# Patient Record
Sex: MALE | Race: WHITE
[De-identification: names, ages, dates, MRNs, and addresses within clinical notes are randomized per-mention and may not be internally consistent; named-entity substitution may affect disease eponyms.]

---

## 2017-03-08 ENCOUNTER — HOSPITAL ENCOUNTER (OUTPATIENT)
Dept: HOSPITAL 80 - FCATH | Age: 67
Setting detail: OBSERVATION
LOS: 1 days | Discharge: HOME | End: 2017-03-09
Admitting: INTERNAL MEDICINE
Payer: COMMERCIAL

## 2017-03-08 DIAGNOSIS — I25.10: ICD-10-CM

## 2017-03-08 DIAGNOSIS — E55.9: ICD-10-CM

## 2017-03-08 DIAGNOSIS — D51.0: ICD-10-CM

## 2017-03-08 DIAGNOSIS — R55: Primary | ICD-10-CM

## 2017-03-08 LAB
% IMMATURE GRANULYOCYTES: 0.2 % (ref 0–1.1)
ABSOLUTE IMMATURE GRANULOCYTES: 0.01 10^3/UL (ref 0–0.1)
ABSOLUTE NRBC COUNT: 0 10^3/UL (ref 0–0.01)
ADD DIFF?: NO
ADD MORPH?: NO
ADD SCAN?: NO
ANION GAP SERPL CALC-SCNC: 10 MEQ/L (ref 8–16)
APTT BLD: 32.3 SEC (ref 23–38)
ATYPICAL LYMPHOCYTE FLAG: 0 (ref 0–99)
CALCIUM SERPL-MCNC: 9.5 MG/DL (ref 8.5–10.4)
CHLORIDE SERPL-SCNC: 107 MEQ/L (ref 97–110)
CO2 SERPL-SCNC: 25 MEQ/L (ref 22–31)
CREAT SERPL-MCNC: 0.9 MG/DL (ref 0.7–1.3)
ERYTHROCYTE [DISTWIDTH] IN BLOOD BY AUTOMATED COUNT: 13.2 % (ref 11.5–15.2)
FRAGMENT RBC FLAG: 0 (ref 0–99)
GFR SERPL CREATININE-BSD FRML MDRD: > 60 ML/MIN/{1.73_M2}
GLUCOSE SERPL-MCNC: 109 MG/DL (ref 70–100)
HCT VFR BLD CALC: 47.8 % (ref 40–51)
HGB BLD-MCNC: 16.4 G/DL (ref 13.7–17.5)
INR PPP: 1.04 (ref 0.83–1.16)
LEFT SHIFT FLG: 0 (ref 0–99)
LIPEMIA HEMOLYSIS FLAG: 90 (ref 0–99)
MAGNESIUM SERPL-MCNC: 2.2 MG/DL (ref 1.6–2.3)
MCH RBC BLDCO QN: 30.3 PG (ref 27.9–34.1)
MCHC RBC AUTO-ENTMCNC: 34.3 G/DL (ref 32.4–36.7)
MCV RBC AUTO: 88.2 FL (ref 81.5–99.8)
NRBC-AUTO%: 0 % (ref 0–0.2)
PLATELET # BLD: 342 10^3/UL (ref 150–400)
PLATELET CLUMPS FLAG: 0 (ref 0–99)
PMV BLD AUTO: 9.3 FL (ref 8.7–11.7)
POTASSIUM SERPL-SCNC: 4.4 MEQ/L (ref 3.5–5.2)
PROTHROMBIN TIME: 13.5 SEC (ref 12–15)
RBC # BLD AUTO: 5.42 10^6/UL (ref 4.4–6.38)
SODIUM SERPL-SCNC: 142 MEQ/L (ref 134–144)

## 2017-03-08 PROCEDURE — C1887 CATHETER, GUIDING: HCPCS

## 2017-03-08 PROCEDURE — 93005 ELECTROCARDIOGRAM TRACING: CPT

## 2017-03-08 PROCEDURE — 027034Z DILATION OF CORONARY ARTERY, ONE ARTERY WITH DRUG-ELUTING INTRALUMINAL DEVICE, PERCUTANEOUS APPROACH: ICD-10-PCS | Performed by: INTERNAL MEDICINE

## 2017-03-08 PROCEDURE — C1769 GUIDE WIRE: HCPCS

## 2017-03-08 PROCEDURE — 93454 CORONARY ARTERY ANGIO S&I: CPT

## 2017-03-08 PROCEDURE — C1725 CATH, TRANSLUMIN NON-LASER: HCPCS

## 2017-03-08 PROCEDURE — B2151ZZ FLUOROSCOPY OF LEFT HEART USING LOW OSMOLAR CONTRAST: ICD-10-PCS

## 2017-03-08 PROCEDURE — C1874 STENT, COATED/COV W/DEL SYS: HCPCS

## 2017-03-08 PROCEDURE — C1764 EVENT RECORDER, CARDIAC: HCPCS

## 2017-03-08 PROCEDURE — B2111ZZ FLUOROSCOPY OF MULTIPLE CORONARY ARTERIES USING LOW OSMOLAR CONTRAST: ICD-10-PCS

## 2017-03-08 PROCEDURE — C1760 CLOSURE DEV, VASC: HCPCS

## 2017-03-08 PROCEDURE — C9600 PERC DRUG-EL COR STENT SING: HCPCS

## 2017-03-08 PROCEDURE — 33282: CPT

## 2017-03-08 PROCEDURE — 4A023N7 MEASUREMENT OF CARDIAC SAMPLING AND PRESSURE, LEFT HEART, PERCUTANEOUS APPROACH: ICD-10-PCS | Performed by: INTERNAL MEDICINE

## 2017-03-08 NOTE — CPIP
DATE OF PROCEDURE:  03/08/2017



INDICATION FOR PROCEDURE:  Non-prodromal syncope.



PROCEDURE PERFORMED:  Left heart catheterization.



PATIENT SUMMARY:  The patient is a pleasant 67-year-old gentleman, who has been 
followed closely by Dr. Otto at [facility], who was 
in his usual state of health until last fall when he had an episode of non-
prodromal syncope while riding his bike.  He had no memory of the details 
surrounding the event.  He did undergo extensive workup including unremarkable 
complete 2D echocardiogram, normal exercise nuclear stress test, normal event 
monitoring.  Cardiac MRI was also unremarkable.  Did have a CT of the chest, 
which did demonstrate isolated right coronary artery plaque. 



In the setting of non-prodromal syncope coupled with evidence of PVCs, plan was 
for electrophysiology study with Dr. Quinones.  However, in the setting of PVCs and 
evidence of calcified plaque on CT of the chest, diagnostic left heart 
catheterization was performed.



PROCEDURE:  After an informed consent was obtained, the patient was brought to 
the cardiac catheterization lab, where he was prepped and draped in a sterile 
fashion.  Using 1% lidocaine, the right groin was anesthetized.  Using modified 
Seldinger technique, a 6-British sheath was placed into the right common femoral 
artery without complication.  Side port was flushed without complication. 



A JL4 catheter was used to take images of the left coronary anatomy in multiple 
projections.  The JL4 catheter was exchanged over a guidewire for a JR4 
catheter.  JR4 catheter was used to take images of the right coronary anatomy 
in multiple projections.  Right JR4 catheter was removed over a wire without 
complications.



FINDINGS:  

1.  Left main normal size and caliber and bifurcates into the left anterior 
descending and left circumflex coronary artery.  Left main was free of coronary 
artery disease.  Left main provided 1st and 2nd diagonal branch and multiple 
septal perforators.  There was no evidence of coronary disease throughout the 
left anterior descending artery.

2.  Left circumflex artery was a codominant vessel.  There was no evidence of 
coronary disease within the circumflex vessels as well as a large 1st obtuse 
marginal branch.  Right coronary artery was a large-caliber codominant vessel.  
There was an 80% tubular proximal stenosis in the right coronary artery.



CONCLUSION:  

1.  Severe single-vessel coronary artery disease.

2.  Normal left coronary anatomy.



I have reviewed these images with my interventional colleague, Dr. Ricardo 
Eleuterio.  The patient was scheduled for immediate post-diagnostic left heart 
catheterization percutaneous coronary intervention to the right coronary 
artery.  Please see his dictation for details.





Job #:  [id number]

MTDD

## 2017-03-08 NOTE — GHP
DATE OF ADMISSION:  03/08/2017



INDICATION FOR ADMISSION:  Post PCI.



HISTORY OF PRESENT ILLNESS:  The patient is a pleasant 67-year-old gentleman with a past medical his
tory of hyperlipidemia, left ventricular hypertrophy, history of pernicious anemia, and recent histo
ry of syncope without prodrome while riding his bike.  He had undergone extensive workup to date, in
cluding complete 2D echocardiogram, normal exercise nuclear stress test, unremarkable Holter monitor
, as well as cardiac MRI.  He was scheduled for outpatient elective admission today for diagnostic l
eft heart catheterization, followed by EP study and implantable loop recorder. 



Diagnostic left heart catheterization demonstrated a significant focal tubular stenosis of the proxi
mal codominant right coronary artery.  He underwent successful percutaneous coronary intervention to
 the proximal right coronary artery without complication.  In the setting of finding significant sin
gle-vessel coronary artery disease, EP study was canceled.  Plan for implantable loop recorder to be
 placed tomorrow prior to discharge. 



He tolerated the procedure well.



PAST MEDICAL HISTORY:  

1.  Pernicious anemia.  

2.  Vitamin D deficiency.  

3.  Hyperlipidemia.

4.  History of left ventricular hypertrophy.  

5.  History of non-prodromal syncope.



OUTPATIENT MEDICATIONS:  Include:

1.  Aspirin 81 mg daily.  

2.  Atorvastatin 20 mg daily.  

3.  CoQ10 200 mg once daily.

4.  Fish oil daily.

5.  Multivitamin once daily.

6.  Vitamin B12 injections 1000 mcg/mL, 1500 mcg injection IM q.1 month.

7.  Vitamin D3 400 units daily.

8.  Cyclobenzaprine 10 mg, take 1/2 tab p.o. q.h.s. p.r.n. muscle pain.

9.  Cialis 10 mg p.o. p.r.n.



ALLERGIES:  No known drug allergies.



SOCIAL HISTORY:  He is .  He lives with his wife.  He is an avid cyclist.  He has a son who i
s an ER physician.  He is a nonsmoker.



FAMILY HISTORY:  No family history of premature coronary artery disease.



EXAMINATION:  GENERAL:  Prior to left heart catheterization, patient was awake, alert, oriented, alison
ropriate, no apparent distress.  The patient was hemodynamically stable.



IMPRESSION:  

1.  Severe single-vessel coronary artery disease.  

2.  Successful PCI to the proximal codominant right coronary artery.  

3.  Preserved left ventricular function.  

4.  History of syncope.



PLAN:  

1.  Admit to [facility] overnight.

2.  Initiate aspirin 325 mg once daily.

3.  Prasugrel 60 mg loading today.  Will start 10 mg p.o. daily tomorrow.

4.  Continue atorvastatin 20 mg daily.  

5.  The patient will remain n.p.o. after midnight tonight in anticipation of implantable loop record
er tomorrow morning, to be done prior to discharge.





Job #:  [id number]

## 2017-03-08 NOTE — CPEKG
Heart Rate: 75

RR Interval: 800

P-R Interval: 180

QRSD Interval: 98

QT Interval: 384

QTC Interval: 429

P Axis: -1

QRS Axis: -32

T Wave Axis: 36

EKG Severity - OTHERWISE NORMAL ECG -

EKG Impression: SINUS RHYTHM

EKG Impression: LEFT AXIS DEVIATION

Electronically Signed By: Cecilia Altamirano 08-Mar-2017 09:17:25

## 2017-03-08 NOTE — PDDXCAT
Diagnostic Cath Note





- .


Date: 03/08/17


Intervention: 


See Below





*Procedure


 


1. Selective coronary angiography


2. Drug eluting stent implantation in the mid right coronary artery





Indication: I was called for an intraoperative consultation after the 

identification of an 80% mid right coronary artery lesion during diagnostic 

cardiac catheterization with Dr. Segura.





Access: right femoral artery





*Materials





Coronary Angiography Size: 6F





Coronary Angiography Materials: JR4.0, 0.014" Intuition Guide Wire, 3.0 x 32 mm 

Synergy drug eluting stent, 3.5 x 15 NC Emerge balloon, 4.5 x 8 mm compliant 

[place] Emerge balloon.





*Findings-Selective coronary angiography





RCA: The right coronary artery is dominant and ~3.5 mm in size. There is a mid 

RCA lesion estimated to be 80% in plaque area stenosis with LYNN III flow and a 

prestenotic dilation of the artery estimated to be 4.5 mm in size. This lesion 

was revascularized with a 3.0 x 32 mm Synergy drug eluting stent as outlined 

below.





*Intervention





Intervention: 


A 6 Upper sorbian JR4.0 guiding catheter was used for guide catheter support. A 0.014" 

Intuition Guide Wire was advanced to the distal right coronary artery under 

direct fluoroscopic and angiographic guidance. Angiomax bolus and continuous 

infusion were given continuously with a documented ACT over 300 seconds. 





A 3.0 x 32 mm Synergy drug eluting stent was placed in the RCA lesion (80% with 

LYNN III flow) and inflated to a maximum of 18 yoshi of pressure. S/p stent 

implantation, a 3.5 x 15 mm NC Emerge balloon was used to post-dilate the 

lesion under 16 yoshi of pressure. S/p post-dilation, the stent was still not 

opposed to the aneurysmal segment of the vessel wall proximal to the original 

lesion. A 4.5 x 8 mm complaint [place] Emerge balloon was placed in this segment 

and inflated to 16 yoshi of pressure to solve that issue.  





The stent appeared well-opposed with 0% residual stenosis and LYNN III flow s/p 

stent implantation and post dilation with both NC and compliant balloons.





*Summary


Complications: None


Estimated blood loss: <50ml


Closure method: Angioseal





Assessment: 





1. Successful drug eluting stent implantation in the mid right coronary artery 

with excellent angiographic results (80% plaque area stenosis pre stent 

implantation with LYNN III flow | 0% plaque area stenosis post stent 

implantation with LYNN III flow).


Patient Problems: 


 Problems











Problem Status Onset


 


MSSA (methicillin susceptible Staphylococcus aureus) Acute  


 


Osteomyelitis of right elbow Acute  


 


Septic bursitis of elbow Acute

## 2017-03-08 NOTE — CPEKG
Heart Rate: 72

RR Interval: 833

P-R Interval: 192

QRSD Interval: 96

QT Interval: 396

QTC Interval: 434

P Axis: 27

QRS Axis: -15

T Wave Axis: 33

EKG Severity - OTHERWISE NORMAL ECG -

EKG Impression: SINUS RHYTHM

EKG Impression: BORDERLINE LEFT AXIS DEVIATION

Electronically Signed By: Cecilia Altamirano 08-Mar-2017 15:22:12

## 2017-03-09 VITALS
DIASTOLIC BLOOD PRESSURE: 76 MMHG | HEART RATE: 75 BPM | OXYGEN SATURATION: 92 % | SYSTOLIC BLOOD PRESSURE: 116 MMHG | TEMPERATURE: 97.9 F | RESPIRATION RATE: 16 BRPM

## 2017-03-09 LAB
% IMMATURE GRANULYOCYTES: 0.1 % (ref 0–1.1)
ABSOLUTE IMMATURE GRANULOCYTES: 0.01 10^3/UL (ref 0–0.1)
ABSOLUTE NRBC COUNT: 0 10^3/UL (ref 0–0.01)
ADD DIFF?: NO
ADD MORPH?: NO
ADD SCAN?: NO
ALBUMIN SERPL-MCNC: 3.5 G/DL (ref 3.5–5)
ANION GAP SERPL CALC-SCNC: 7 MEQ/L (ref 8–16)
AST SERPL-CCNC: 22 IU/L (ref 17–59)
ATYPICAL LYMPHOCYTE FLAG: 0 (ref 0–99)
BILIRUB SERPL-MCNC: 0.8 MG/DL (ref 0.1–1.4)
CALCIUM SERPL-MCNC: 9.3 MG/DL (ref 8.5–10.4)
CHLORIDE SERPL-SCNC: 109 MEQ/L (ref 97–110)
CO2 SERPL-SCNC: 25 MEQ/L (ref 22–31)
CREAT SERPL-MCNC: 0.8 MG/DL (ref 0.7–1.3)
ERYTHROCYTE [DISTWIDTH] IN BLOOD BY AUTOMATED COUNT: 13.4 % (ref 11.5–15.2)
FRAGMENT RBC FLAG: 0 (ref 0–99)
GFR SERPL CREATININE-BSD FRML MDRD: > 60 ML/MIN/{1.73_M2}
GLUCOSE SERPL-MCNC: 89 MG/DL (ref 70–100)
HCT VFR BLD CALC: 45.5 % (ref 40–51)
HGB BLD-MCNC: 15.7 G/DL (ref 13.7–17.5)
LDH SERPL-CCNC: 349 IU/L (ref 313–618)
LEFT SHIFT FLG: 0 (ref 0–99)
LIPEMIA HEMOLYSIS FLAG: 90 (ref 0–99)
MAGNESIUM SERPL-MCNC: 2.1 MG/DL (ref 1.6–2.3)
MCH RBC BLDCO QN: 30.7 PG (ref 27.9–34.1)
MCHC RBC AUTO-ENTMCNC: 34.5 G/DL (ref 32.4–36.7)
MCV RBC AUTO: 88.9 FL (ref 81.5–99.8)
NRBC-AUTO%: 0 % (ref 0–0.2)
PLATELET # BLD: 304 10^3/UL (ref 150–400)
PLATELET CLUMPS FLAG: 0 (ref 0–99)
PMV BLD AUTO: 9.7 FL (ref 8.7–11.7)
POTASSIUM SERPL-SCNC: 4.3 MEQ/L (ref 3.5–5.2)
RBC # BLD AUTO: 5.12 10^6/UL (ref 4.4–6.38)
SODIUM SERPL-SCNC: 141 MEQ/L (ref 134–144)

## 2017-03-09 PROCEDURE — 0JH63PZ INSERTION OF CARDIAC RHYTHM RELATED DEVICE INTO CHEST SUBCUTANEOUS TISSUE AND FASCIA, PERCUTANEOUS APPROACH: ICD-10-PCS | Performed by: INTERNAL MEDICINE

## 2017-03-09 NOTE — CPEKG
Heart Rate: 75

RR Interval: 800

P-R Interval: 180

QRSD Interval: 100

QT Interval: 380

QTC Interval: 425

P Axis: -12

QRS Axis: -36

T Wave Axis: 48

EKG Severity - OTHERWISE NORMAL ECG -

EKG Impression: SINUS RHYTHM

EKG Impression: LEFT AXIS DEVIATION

Electronically Signed By: Cecilia Altamirano 09-Mar-2017 15:09:41

## 2017-03-09 NOTE — GPN
[f rep st]



                                                                 PROCEDURE NOTE





PROCEDURE PERFORMED:  Implantable loop recorder, Medtronic.



INDICATION FOR PROCEDURE:  Non prodromal syncope.



PROCEDURE:  After informed consent was obtained, the patient was brought to the cardiac procedure ro
om where he was prepped and draped in a sterile fashion.  Using 1% lidocaine, the left fourth interc
ostal space was 2 cm from midline was anesthetized.  Once appropriate level of anesthesia was obtain
ed locally, incision was made with the scalpel blade provided in the Medtronic kit with modification
 of this incision with scalpel blade.  Appropriate tract with the syringe was used.  The device was 
implanted at a 45 degree angle without difficulty.  This was at least 1 cm below the incision.  Michelle
ent tolerated the procedure well.  Hemostasis was achieved.  Sterile dressing and Steri-Strips were 
applied.  There were no postoperative complications. 



Device was checked demonstrating normal device function.



PLAN:  

1.  Patient has been given postoperative instructions. 

2.  Patient will follow up in our office next Friday for device check and wound check.





Job #:  [id number]

## 2017-03-09 NOTE — GDS
[f rep st]



                                                             DISCHARGE SUMMARY





INDICATION:  Elective admission for diagnostic left heart catheterization, followed by possible AJIT morales in the setting of nonprodromal syncope and evidence of calcification in the right coronary negrita
ry and ventricular ectopy noted during treadmill stress test.



HOSPITAL COURSE:  Mr. Miranda underwent diagnostic left heart catheterization prior to the to EP stud
y.  Left heart catheterization demonstrated significant single-vessel coronary artery disease with a
 focal proximal tubular stenosis of approximately 80% in a codominant right coronary artery.  He und
erwent successful deployment of a single 3.0 x 32 mm Synergy drug-eluting stent to the proximal righ
t coronary artery disease without complications.  He tolerated the procedure well. 



He remained hemodynamically stable overnight without complications.  On the morning of March 9, 2017
, he underwent implantable loop recorder deployment without complication.  He tolerated the procedur
e well.  Hemostasis was achieved.  Sterile dressing was applied.  Device check demonstrated normal d
evice function. 



He remained stable for discharge after implantable loop recorder deployment.



MEDICATIONS AT TIME OF DISCHARGE:  

1.  Aspirin 325 mg once daily.  

2.  Prasugrel/Effient 10 mg daily.

3.  Atorvastatin 20 mg daily.

4.  Nitroglycerin 0.4 mg sublingual p.r.n. chest pain x3 q.5 minutes.

5.  Temazepam 50 mg p.o. h.s. p.r.n.

6.  Vitamin D3 1000 units daily.

7.  B12 1000 mcg injection of 1500 mcg IM q.1 month.

8.  Fish oil 1000 mg capsule once daily.

9.  Multivitamin once daily.



ALLERGIES:  To medications, none.



EXAM:  At time of discharge, patient is awake, alert, oriented and appropriate.  Blood pressure is 1
00/74, heart rate 82, in sinus rhythm, respiratory rate of 18, oxygen saturation 94% on room air.  H
e is afebrile at 36.8 Celsius.  He is awake, alert, oriented, appropriate, no apparent distress.  CA
RDIAC:  S1, S2.  Regular rate and rhythm.  No murmurs, rubs, or gallops.  LUNGS:  Clear to auscultat
ion bilaterally.  Right groin site is without hematoma or ecchymosis.  Distal pulses are intact.



LAB WORK:  At time of discharge, white blood cell count 6.72, hematocrit 45.5, and hemoglobin of 15.
7.  Sodium 141, potassium 4.3, chloride 109, bicarb 25, BUN 15, creatinine 0.8, magnesium 2.1.  AST 
22.



PLAN:  At time of discharge:

1.  Patient will be discharged home.  

2.  He is scheduled to follow up with me in the office on Friday March 17, 2017 for a 9 o'clock deysi
ce check, followed by a 9:30 appointment with me. 



Patient has been given postoperative instructions for both left heart catheterization and implantabl
e loop recorder.  Over 30 minutes were spent in planning this discharge summary.





Job #:  [id number]

## 2017-03-09 NOTE — PDCARPN
Cardiology Progress Note


Chief Complaint: 





Mr. Miranda is feeling well this morning . NO complaints. No events overnight. 

No right groin tenderness. Hemodynamically stable. Labs demonstrate normal 

renal function.  


Assessment/Plan: 


Assessment:


1. Single Vessel CAD with PCI to the proixmal RCA yesterday


2. Syncope








Plan:


-plan for Medtronic LINQ this morning


-continue DAPT with aspirin 325 mg daily and Effient 10 mg dailiy


-Atorvastatin 20 mg daily


-[facility] order submitted


-Follow up with me next week


-Discharge home after LINQ 





03/09/17 07:40





Reviewed/Discussed With: family


Time Spent With Patient: 





20 minutes 


Objective: 





 Vital Signs (8 Hrs)











  Temp Pulse Resp BP Pulse Ox


 


 03/09/17 04:00  36.8 C  82  18  100/74  94








 Intake/Output (24 Hrs)











 03/08/17 03/09/17 03/10/17





 05:59 05:59 05:59


 


Intake Total  200 


 


Output Total  300 


 


Balance  -100 


 


Intake:   


 


  Oral (ml)  200 


 


Output:   


 


  Urine (ml)  300 


 


    Urinal  300 


 


Other:   


 


  Weight  90.7 kg 


 


  Number of Voids   


 


    Toilet  1 











Result Diagrams: 


 03/09/17 04:15





 03/09/17 04:15





- Physical Exam


Constitutional: WDWN


Cardiovascular: regular rate and rhythm, no murmurs, no rubs, no gallops


Peripheral Pulses: 2+: dorsalis-pedis (R), dorsalis-pedis (L)


Respiratory: clear to auscultate bilat


Skin: no rashes


Musculoskeletal: no muscular tenderness


Neurologic: AAOx3, CN II-XII grossly intact


Psychiatric: cooperative





ICD10 Worksheet


Patient Problems: 


 Problems











Problem Status Onset


 


MSSA (methicillin susceptible Staphylococcus aureus) Acute  


 


Osteomyelitis of right elbow Acute  


 


Septic bursitis of elbow Acute

## 2018-01-31 ENCOUNTER — HOSPITAL ENCOUNTER (OUTPATIENT)
Dept: HOSPITAL 80 - FCATH | Age: 68
Setting detail: OBSERVATION
LOS: 1 days | Discharge: HOME | End: 2018-02-01
Attending: INTERNAL MEDICINE | Admitting: INTERNAL MEDICINE
Payer: COMMERCIAL

## 2018-01-31 VITALS — RESPIRATION RATE: 18 BRPM

## 2018-01-31 DIAGNOSIS — I25.10: ICD-10-CM

## 2018-01-31 DIAGNOSIS — Z95.5: ICD-10-CM

## 2018-01-31 DIAGNOSIS — G47.33: ICD-10-CM

## 2018-01-31 DIAGNOSIS — I47.1: Primary | ICD-10-CM

## 2018-01-31 DIAGNOSIS — R55: ICD-10-CM

## 2018-01-31 LAB
INR PPP: 0.98 (ref 0.83–1.16)
PLATELET # BLD: 292 10^3/UL (ref 150–400)
PROTHROMBIN TIME: 13.2 SEC (ref 12–15)

## 2018-01-31 PROCEDURE — 02K83ZZ MAP CONDUCTION MECHANISM, PERCUTANEOUS APPROACH: ICD-10-PCS | Performed by: INTERNAL MEDICINE

## 2018-01-31 PROCEDURE — C1732 CATH, EP, DIAG/ABL, 3D/VECT: HCPCS

## 2018-01-31 PROCEDURE — 4A023FZ MEASUREMENT OF CARDIAC RHYTHM, PERCUTANEOUS APPROACH: ICD-10-PCS | Performed by: INTERNAL MEDICINE

## 2018-01-31 PROCEDURE — 93613 INTRACARDIAC EPHYS 3D MAPG: CPT

## 2018-01-31 PROCEDURE — C1731 CATH, EP, 20 OR MORE ELEC: HCPCS

## 2018-01-31 PROCEDURE — C1893 INTRO/SHEATH, FIXED,NON-PEEL: HCPCS

## 2018-01-31 PROCEDURE — 02583ZZ DESTRUCTION OF CONDUCTION MECHANISM, PERCUTANEOUS APPROACH: ICD-10-PCS | Performed by: INTERNAL MEDICINE

## 2018-01-31 PROCEDURE — 93005 ELECTROCARDIOGRAM TRACING: CPT

## 2018-01-31 PROCEDURE — 5A1213Z PERFORMANCE OF CARDIAC PACING, INTERMITTENT: ICD-10-PCS | Performed by: INTERNAL MEDICINE

## 2018-01-31 PROCEDURE — 93623 PRGRMD STIMJ&PACG IV RX NFS: CPT

## 2018-01-31 PROCEDURE — 93621 COMP EP EVL L PAC&REC C SINS: CPT

## 2018-01-31 PROCEDURE — 93306 TTE W/DOPPLER COMPLETE: CPT

## 2018-01-31 PROCEDURE — 93653 COMPRE EP EVAL TX SVT: CPT

## 2018-01-31 PROCEDURE — C1730 CATH, EP, 19 OR FEW ELECT: HCPCS

## 2018-01-31 NOTE — CPEKG
Heart Rate: 89

RR Interval: 674

P-R Interval: 176

QRSD Interval: 96

QT Interval: 368

QTC Interval: 448

P Axis: 41

QRS Axis: -35

T Wave Axis: 26

EKG Severity - OTHERWISE NORMAL ECG -

EKG Impression: SINUS RHYTHM

EKG Impression: LEFT AXIS DEVIATION

Electronically Signed By: Baldemar Quinones 31-Jan-2018 21:40:15

## 2018-01-31 NOTE — CPEKG
Heart Rate: 65

RR Interval: 923

P-R Interval: 192

QRSD Interval: 94

QT Interval: 408

QTC Interval: 425

P Axis: -3

QRS Axis: -31

T Wave Axis: 20

EKG Severity - OTHERWISE NORMAL ECG -

EKG Impression: SINUS RHYTHM

EKG Impression: LEFT AXIS DEVIATION

Electronically Signed By: Baldemar Quinones 31-Jan-2018 08:00:34

## 2018-01-31 NOTE — PDANEPAE
ANE History of Present Illness





67 year old male with non-prodromal syncope/near syncope.  Patient had symptoms 

last year, underwent heart anmol and discovered to have RCA lesion, stented at 

that time.  Also, Link recorder implanted at that time.  Link recorder 

displaying episodes of tachycardia > 150bpm.  Patient now presents for EP study 

with Dr. Quinones.





ANE Past Medical History





- Cardiovascular History


Hx Hypertension: No


Hx Arrhythmias: Yes


Hx Chest Pain: No


Hx Coronary Artery / Peripheral Vascular Disease: No


Hx CHF / Valvular Disease: No


Cardiovascular History Comment: History of RCA lesion stented in 2017; Non-

prodromal syncope/near syncope and link recorder with tachycardia (SVT vs. 

Sinus Tach, but exact ryhthm unknown)





- Pulmonary History


Hx COPD: No


Hx Asthma/Reactive Airway Disease: No


Hx Recent Upper Respiratory Infection: No


Hx Oxygen in Use at Home: No


Hx Sleep Apnea: Yes





- Endocrine History


Hx Diabetes: No


Hypothyroid: No


Hyperthyroid: No


Obesity: no





- Renal History


Hx Renal Disorders: No





- Liver History


Hx Hepatic Disorders: No





- Neurological & Psychiatric Hx


Hx Neurological and Psychiatric Disorders: No





- Cancer History


Hx Cancer: No





- Congenital Disorder History


Hx Congenital Disorders: No





- GI History


GERD: mild





- Chronic Pain History


Chronic Pain: No





ANE Review of Systems


Review of systems is: negative


Review of Systems: 








- Exercise capacity


Exercise capacity: >=4 METS





ANE Patient History





- Allergies


Allergies/Adverse Reactions: 








No Known Allergies Allergy (Verified 10/02/16 14:41)


 








- Home Medications


Home medications: home medication list seen and reviewed


Home Medications: 








Cholecalciferol Vit D3 [Vitamin D3 (*)] 1,000 units PO DAILY 08/17/16 [Last 

Taken 01/30/18]


Cyanocobalamin [Vitamin B12 1000MCG/ML (*)] 1,500 mcg IM Q30D 08/17/16 [Last 

Taken 01/15/18]


Herbals/Supplements -Info Only 1 ea PO DAILY 08/17/16 [Last Taken Unknown]


Multivitamins [Multivitamin (*)] 1 each PO DAILY 03/08/17 [Last Taken 01/30/18]


Omega-3 Fatty Acids [Fish Oil 1000 mg (*)] 1,000 mg PO DAILY 01/31/18 [Last 

Taken 01/30/18]








- NPO status


NPO Status: no food or drink >8 hours





- Anes Hx


Anes Hx: no prior problems





- Smoking Hx


Smoking Status: Never smoked


Marijuana use: No





- Alcohol Use


Alcohol Use: Occasionally





- Family Anes Hx


Family Anes Hx: neg - N/A





ANE Labs/Vital Signs





- Labs


Result Diagrams: 


 01/31/18 07:20





 01/31/18 07:20





- Vital Signs


Vital Signs: reviewed preoperatively; see RN documention for details


Height: 178 cm


Weight: 84.8 kg





ANE Physical Exam





- Airway


Neck exam: FROM


Mallampati Score: Class 1


Mouth exam: normal dental/mouth exam


Mouth image: 


  __________________________














  __________________________





 1 - Chipped inferior cutting surface








- Pulmonary


Pulmonary: no respiratory distress





- Cardiovascular


Cardiovascular: regular rate and rhythym





- ASA Status


ASA Status: III





ANE Anesthesia Plan


Anesthesia Plan: general endotracheal anesthesia


Total IV Anesthesia: No

## 2018-01-31 NOTE — EPPROC
Electrophysiology Procedure Note: 





ELECTROPHYSIOLOGIC STUDY AND   CATHETER MEDIATED ABLATION OF SLOW/FAST AV YEE 

REENTRY TACHYCARDIA





PROCEDURES PERFORMED:





01070-36    EP evaluation with RA/RV/LA pace/record, with arrhythmia induction





03850-60    EP evaluation with RA/RV pace record, insert/reposition catheter, 

with arrhythmia induction





01503      Intracardiac catheter ablation, SVT arrhythmogenic focus 





80949      3D mapping





Fluoroscopy








INDICATION:





Syncope


SVT seen on LINQ monitor





PROCEDURE:





Catheters & Anesthesia: 





The patient arrived in the Electrophysiology Laboratory in the fasting state.  

The right clavicular region, right groin, and left groin area were prepped and 

draped in the usual sterile manner.  Anesthesiologist Dr. Fei Brown 

administered general anesthesia.   Appropriate non-invasive blood pressure, 

pulse oximetry and end-tidal CO2 monitoring was established.


All catheters were placed percutaneously using the modified Seldinger technique

, and advanced into position under fluoroscopic guidance. One #6 [place]araguan 

hexapolar non-deflectable electrode catheter was inserted into the right atrial 

appendage via the left femoral vein (2mm spacing; except the proximal ring 

which was 25cm from the tip  used for unipolar recordings).  One #7 [place]araguan 

deflectable octapolar electrode catheter was advanced to the His-bundle 

position via the left femoral vein (2mm spacing).  One #7 [place]araguan deflectable 

quadrapolar catheter was advanced to the anteroseptal right ventricle via the 

right femoral vein.   One #7 Nicaraguan deflectable catheter with 10 pairs of 

electrodes was placed via the right femoral vein into the coronary sinus. 


Heparin was given to keep ACT > 200 s.





Programmed stimulation was performed from the right atrium, right ventricle and 

coronary sinus (left atrium).   Parahisian pacing demonstrated constant H-A 

interval with changing V-A intervals and stimulus-A intervals during capture 

and loss of capture of proximal RBB proving retrograde conduction over AV node.

  AVNRT was induced easily during infusion of isoproterenol 4 mcg/min + 0.5 mg 

of atropine.  Tachycardia started with long AH interval.  During change in 

tachycardia cycle length, HH interval preceded change in a interval.  

Tachycardia was nonsustained for about 25 seconds and therefore entrainment 

could not be performed.  VA interval was 50 ms. Post entrainment of the 

tachycardia from the ventricle, there was VAHV response.





Mapping of the right atrium and coronary sinus during AVNRT identified earliest 

atrial activation above the tendon of Becky at a level slightly posterior to 

the level of the His bundle, consistent with retrograde conduction over the 

fast AV yee pathway.    





A #8 Nicaraguan deflectable quadrapolar electrode catheter (2mm-5mm-2mm spacing) 

with 4 mm tip electrode and sensor for the 3D mapping Carto system was advanced 

to the right atrium. 3 D mapping of the inter-atrial septum and coronary sinus 

was performed and location of the AV node was marked.





 A SL2 sheath was used.  RF applications were delivered to the region between 

the tricuspid annulus   and the coronary sinus ostium, at the level of the 

upper edge of the coronary sinus ostium. Radiofrequency applications were also 

delivered along the roof of the proximal coronary sinus.    Junctional rhythm 

occurred during all of the RF applications.





Programmed stimulation was continued   post ablation at baseline and during 

graded doses of isoproterenol upto 4mcg/min.   Sustained AVNRT was not 

inducible.  There were no echo beats.





The catheters were removed.  The long sheath was changed to a short 9 Fr 

sheath. The patient was transferred to the cardiovascular holding area in 

stable condition.  Vascular access sheaths were removed in the EP lab after 

pursestring suture was applied.  There were no apparent complications.





Results:





A.   Spontaneous Intervals:


Pre ablation     ms AH 85 ms HV 45 ms


Post ablation  SCL _700_ ms  ms HV 45 ms





B.   Antegrade AV yee function (decremental pacing)


Pre ablation    FPERP 440 ms   WBB  ms 


Post ablation    FPERP 410 ms   WBB  ms





C.   Retrograde AV yee function (decremental pacing)


Pre ablation    FPERP 420 ms   WBB  ms 





D.    Arrhythmias:


Sustained slow/fast AVNRT


Cycle length 390 ms, AH interval 230 ms, HA interval 140 ms 


VA interval 50 ms








CONCLUSIONS





1.   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 

antegrade conduction and the fast AV yee pathway for retrograde conduction. (

Slow/fast AVNRT). 


2.   Successful ablation of the slow AV yee pathway with elimination of 1:1 

antegrade conduction over the slow AV yee pathway, all retrograde conduction 

over the slow AV yee pathway and the inducibility of AVNRT.


3.   No complications.





Patient Problems: 


 Problems











Problem Status Onset


 


Syncope Acute  


 


Septic bursitis of elbow Acute  


 


MSSA (methicillin susceptible Staphylococcus aureus) Acute  


 


Osteomyelitis of right elbow Acute

## 2018-01-31 NOTE — PDGENHP
History & Physical


Chief Complaint: syncope in past, rca stent


History of Present Illness: syncope in past.  hr 167 bpm


Relevant Physical Exam: s1s2 rrr.  cta.  ao3


Cardiorespiratory Assessment: syncope in past.  rca stent was done and eps was 

cancelled at that time due to critical rca lesion.  linq monitor shows st or 

svt 167 bpm

## 2018-02-01 VITALS
TEMPERATURE: 98 F | OXYGEN SATURATION: 94 % | HEART RATE: 78 BPM | DIASTOLIC BLOOD PRESSURE: 78 MMHG | SYSTOLIC BLOOD PRESSURE: 122 MMHG

## 2018-02-01 LAB
CK SERPL-CCNC: 71 IU/L (ref 0–224)
INR PPP: 1.04 (ref 0.83–1.16)
PLATELET # BLD: 278 10^3/UL (ref 150–400)
PROTHROMBIN TIME: 13.8 SEC (ref 12–15)

## 2018-02-01 NOTE — ASMTCASEMG
Living Arrangements

 

What is your living           Answers:  With Spouse                           

arrangement? Who do you                                                       

live with?                                                                    

Type Of Residence

 

What kind of residence do     Answers:  House                                 

you live in?                                                                  

Discharge Plan Comments

 

Coordination Status           

Comments                      

Notes:

CM spoke w/ LESA Chen regarding d/c POC. Pt is a 68 y/o man admitted for sp svt ablation. Pt will 


most likely d/c independent when medically stable. No therapies ordered at this time. CM available 

for changes. 







Plan: Independent 

 

Date Signed:  02/01/2018 10:31 AM

Electronically Signed By:GUILLERMINA Simon

## 2018-02-01 NOTE — CPEKG
Heart Rate: 67

RR Interval: 896

P-R Interval: 192

QRSD Interval: 94

QT Interval: 388

QTC Interval: 410

P Axis: -5

QRS Axis: -38

T Wave Axis: 44

EKG Severity - OTHERWISE NORMAL ECG -

EKG Impression: SINUS RHYTHM

EKG Impression: ATRIAL PREMATURE COMPLEX

EKG Impression: LEFT AXIS DEVIATION

Electronically Signed By: Yasmani Dunn 01-Feb-2018 13:08:05

## 2018-02-01 NOTE — ECHO
[email or web address]





[address] [address] 

Main: [phone] 



Fax: 



Transthoracic Echocardiogram 

Name:             ARMIDA BOND                             MR#:

[id number]

Study Date:       2018                              Study Time:

08:59 AM

YOB: 1950                              Age:

67 year(s)

Height:           177.8 cm (70 in.)                       Weight:

84.37 kg (186 lb.)

BSA:              2.02 m2                                 Gender:

Male

Examination:      Echo                                    Indication:

f/u POST EP study

Image Quality:    Adequate                                Contrast: 

Requested by:     Baldemar Quinones                              BP:

122 mmHg/78 mmHg

Heart Rate:                                               Rhythm:

Normal sinus rhythm

Indication:       f/u POST EP study 



Procedure Staff 

Ultrasound Technician:   Karina Perez [facility] 

Reading Physician:       Baldemar Quinones 

Requesting Provider: 



Conclusions:          Mild concentric LV hypertrophy.  

Mid sigmoid septum without LVOT gradient.  

EF is 65 %.  

The left atrium is mildly dilated.  

The right atrium is mildly dilated.  

Mild tricuspid regurgitation is present.  

Mild pulmonic valve regurgitation is noted.  

Cannot rule out PDA.. 



Measurements: 

Chambers                    Valvular Assessment AV/MV

Valvular Assessment TV/PV



Normal                                   Normal

Normal

Name         Value     Range              Name         Value Range

Name           Value Range

Ao Aliyah (MM): 3.4 cm    (2.2 cm-3.7            AV Vmax:     1.51 m/s (1

m/s-1.7       TR Vmax:       1.76 mm/s ( - )



cm)                                  m/s)             TR PGmax:

12 mmHg ( - )

IVSd (2D):   1.2 cm (0.6 cm-1.1               AV maxP mmHg ( -

)          syst. PAP: 17 mmHg  ( - )



cm)                LVOT Vmax:   1.16 m/s (0.7 m/s-1.1     PV Vmax:

1.00 m/s (0.6 m/s-0.9

LVDd (2D):   4.7 cm    (4.2 cm-5.9                              m/s)

m/s)



cm)                DINA (Vmax):  4.1 cm2 ( - )         PV PGmax:      4

mmHg ( - )

LVDs (2D):   3.2 cm    (2.1 cm-4              MV E Vmax:   0.62 m/s (

- )



cm)                MV A Vmax:   0.70 m/s ( - )  

LVPWd (2D):  1.0 cm    (0.6 cm-1              MV E/A:      0.89 ( - )





cm)   

LVOTd        2.6 cm    2.6 cm mm 

LVEF (BP):   65 %      (>=55 %)   

RVDd(2D):    3.4 cm    (1.9 cm-3.8 



cmmm)  



Continued Measurements: 

Chambers                    Valvular Assessment AV/MV

Valvular Assessment TV/PV



Patient: ARMIDA BOND                          MRN: [id number]

Study Date: 2018   Page 1 of 2

08:59 AM 









Name                       Value  Name                      Value

Name                      Value

LADs Lon.8 cm               MV DecTime:

239 m/s     CVP (est.):             5 mmHg

LA Area:                 23.6 cm2         MV E' Septal:          0.06

m/s

LA Volume:               72 ml            MV E/E' Septal:        11.00



LA Volume Index:         35.6 ml/m2       MV E/E' Lateral:       6.90



TAPSE:                   2.4 cm    

RA Area:                 19.3 cm2   



Additional Vessels  



Name                       Value  

Ao Ascending:            3.4 cm    



Findings:             Left Ventricle: 

Normal size left ventricle. Mild concentric LV hypertrophy. Mid

sigmoid septum without LVOT

gradient. Normal global systolic LV function. EF is 65 %. No regional

wall motion abnormality.

Normal diastolic LV function.  

Right Ventricle: 

Normal size right ventricle. Normal RV function.  

Left Atrium: 

The left atrium is mildly dilated.  

Right Atrium: 

The right atrium is mildly dilated.  

Mitral Valve: 

The mitral valve is normal in appearance and function. Trivial mitral

valve regurgitation.

Aortic Valve: 

The aortic valve is normal in appearance and function. There is no

significant aortic valve

regurgitation. No aortic valve stenosis is present.  

Tricuspid Valve: 

The tricuspid valve is normal in appearance and function. Mild

tricuspid regurgitation is present. The

pulmonary artery pressure is normal.  

Pulmonic Valve: 

The pulmonic valve is normal in appearance and function. Mild pulmonic

valve regurgitation is noted.

Cannot rule out PDA..  

Aorta: 

The aorta is normal. Normal size aortic root measuring 3.4 cm. Normal

size ascending aorta

measuring 3.4 cm.  

IVC: 

The IVC is normal sized. No foreign body in inferior vena cava. There

is greater dilma 50% respiratory

excursion.  

Pericardium: 

Trivial anterior pericardial effusion. 







Electronically signed by Baldemar Quinones on 2018 at 10:41 AM 

(No Signature Object) 



Patient: ARMIDA BOND                          MRN: [id number]

Study Date: 2018   Page 2 of 2

08:59 AM 







[id number]
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
## 2018-06-21 ENCOUNTER — HOSPITAL ENCOUNTER (OUTPATIENT)
Dept: HOSPITAL 80 - BMCIMAGING | Age: 68
End: 2018-06-21
Attending: PHYSICIAN ASSISTANT
Payer: COMMERCIAL

## 2018-06-21 DIAGNOSIS — I83.91: ICD-10-CM

## 2018-06-21 DIAGNOSIS — M71.21: Primary | ICD-10-CM

## 2018-06-26 ENCOUNTER — HOSPITAL ENCOUNTER (OUTPATIENT)
Dept: HOSPITAL 80 - BMCIMAGING | Age: 68
End: 2018-06-26
Attending: PHYSICIAN ASSISTANT
Payer: COMMERCIAL

## 2018-06-26 DIAGNOSIS — M25.761: Primary | ICD-10-CM

## 2018-08-30 ENCOUNTER — HOSPITAL ENCOUNTER (OUTPATIENT)
Dept: HOSPITAL 80 - FIMAGING | Age: 68
End: 2018-08-30
Attending: RADIOLOGY
Payer: COMMERCIAL

## 2018-08-30 DIAGNOSIS — I83.811: Primary | ICD-10-CM

## 2018-08-30 DIAGNOSIS — M25.561: ICD-10-CM

## 2018-08-30 DIAGNOSIS — M71.21: ICD-10-CM

## 2018-08-30 PROCEDURE — 76942 ECHO GUIDE FOR BIOPSY: CPT

## 2018-08-30 PROCEDURE — 3E0233Z INTRODUCTION OF ANTI-INFLAMMATORY INTO MUSCLE, PERCUTANEOUS APPROACH: ICD-10-PCS | Performed by: RADIOLOGY

## 2018-08-30 PROCEDURE — 93971 EXTREMITY STUDY: CPT

## 2018-08-30 PROCEDURE — 20612 ASPIRATE/INJ GANGLION CYST: CPT

## 2018-12-10 ENCOUNTER — HOSPITAL ENCOUNTER (OUTPATIENT)
Dept: HOSPITAL 80 - BHFA | Age: 68
End: 2018-12-10
Attending: INTERNAL MEDICINE
Payer: COMMERCIAL

## 2018-12-10 DIAGNOSIS — I48.92: ICD-10-CM

## 2018-12-10 DIAGNOSIS — R55: Primary | ICD-10-CM

## 2018-12-11 ENCOUNTER — HOSPITAL ENCOUNTER (OUTPATIENT)
Dept: HOSPITAL 80 - FIMAGING | Age: 68
Discharge: HOME | End: 2018-12-11
Attending: RADIOLOGY
Payer: COMMERCIAL

## 2018-12-11 VITALS — SYSTOLIC BLOOD PRESSURE: 111 MMHG | DIASTOLIC BLOOD PRESSURE: 67 MMHG

## 2018-12-11 DIAGNOSIS — I83.811: Primary | ICD-10-CM

## 2018-12-11 DIAGNOSIS — I25.10: ICD-10-CM

## 2018-12-11 DIAGNOSIS — Z95.2: ICD-10-CM

## 2018-12-11 PROCEDURE — 3E033TZ INTRODUCTION OF DESTRUCTIVE AGENT INTO PERIPHERAL VEIN, PERCUTANEOUS APPROACH: ICD-10-PCS | Performed by: RADIOLOGY

## 2018-12-11 PROCEDURE — 06DP3ZZ EXTRACTION OF RIGHT SAPHENOUS VEIN, PERCUTANEOUS APPROACH: ICD-10-PCS | Performed by: RADIOLOGY

## 2018-12-11 PROCEDURE — 065P3ZZ DESTRUCTION OF RIGHT SAPHENOUS VEIN, PERCUTANEOUS APPROACH: ICD-10-PCS | Performed by: RADIOLOGY

## 2018-12-11 NOTE — PDRADPN
Radiology Procedure Note


Date of Procedure: 12/11/18


Radiologist: Amy Mayorga


Anesthesia: IV Sedation


Pre-op Diagnosis: LLE VARICOSE VEINS


Post-op Diagnosis: SAME


Indication: PAIN AND SWELLING


Procedure: RT GSV LASER, SCLEROTHERAPY, PHLEBECTOMY


Finding(s): LARGE ROPEY VARICOSE VEINS REMOVED.


Inf/Abcess present in the surg proc area at time of surgery?: No

## 2018-12-11 NOTE — PDGENHP
History & Physical


Chief Complaint: ROPEY RT LE VARICOSE VEINS


History of Present Illness: BIG VARICOSE VEINS FROM RT GSV.


Pertinent Past, Social, Family History: H/O RCA LESION, STENTED IN 2017.


Relevant Physical Exam: LARGE ROPEY VARICOSE VEINS IN RT CALF


Cardiorespiratory Assessment: RRR, CTA

## 2018-12-26 ENCOUNTER — HOSPITAL ENCOUNTER (OUTPATIENT)
Dept: HOSPITAL 80 - FIMAGING | Age: 68
End: 2018-12-26
Attending: RADIOLOGY
Payer: COMMERCIAL

## 2018-12-26 DIAGNOSIS — Z09: Primary | ICD-10-CM

## 2019-02-28 ENCOUNTER — HOSPITAL ENCOUNTER (OUTPATIENT)
Dept: HOSPITAL 80 - FIMAGING | Age: 69
End: 2019-02-28
Attending: RADIOLOGY
Payer: COMMERCIAL

## 2019-02-28 DIAGNOSIS — Z09: Primary | ICD-10-CM

## 2019-02-28 DIAGNOSIS — I83.811: ICD-10-CM

## 2024-10-22 NOTE — ASDISCHSUM
----------------------------------------------

Discharge Information

----------------------------------------------

Plan Status:Home with No Needs                       Medically Cleared to Leave:01/31/2018

Discharge Date:02/01/2018 11:12 AM                   CM D/C Disposition:

ADT D/C Disposition:Home, Routine, Self-Care         Projected Discharge Date:02/01/2018 12:00 AM

Transportation at D/C:                               Discharge Delay Reason:

Follow-Up Date:02/01/2018 12:00 AM                   Discharge Slot:

Final Diagnosis:

----------------------------------------------

Placement Information

----------------------------------------------

----------------------------------------------

Patient Contact Information

----------------------------------------------

Contact Name:PJ                         Relationship:Wife

Address:[address]                              Home [phone]

                                                     Work Phone:

City:[place]                                         Alternate Phone:

[facility]/[employer] Code:[place] [address]                              Email:

----------------------------------------------

Financial Information

----------------------------------------------

Financial Class:

Primary Plan Desc:MEDICARE OUTPATIENT                Primary Plan Number:[id number]

Secondary Plan Desc:[employer]/MDR SUPPLEMENT              Secondary Plan Number:[id number]

 

 

----------------------------------------------

Assessment Information

----------------------------------------------

----------------------------------------------

[facility] Initial CM Assessment

----------------------------------------------

Living Arrangements

 

What is your living           Answers:  With Spouse                           

arrangement? Who do you                                                       

live with?                                                                    

Type Of Residence

 

What kind of residence do     Answers:  House                                 

you live in?                                                                  

Discharge Plan Comments

 

Coordination Status           

Comments                      

Notes:

CM spoke w/ LESA Chen regarding d/c POC. Pt is a 68 y/o man admitted for sp svt ablation. Pt will 


most likely d/c independent when medically stable. No therapies ordered at this time. CM available 

for changes. 







Plan: Independent 

 

Date Signed:  02/01/2018 10:31 AM

Electronically Signed By:GUILLERMINA Simon

 

 

----------------------------------------------

Intervention Information

---------------------------------------------- [see HPI] : see HPI [Negative] : Heme/Lymph